# Patient Record
Sex: MALE | Race: OTHER | Employment: FULL TIME | URBAN - METROPOLITAN AREA
[De-identification: names, ages, dates, MRNs, and addresses within clinical notes are randomized per-mention and may not be internally consistent; named-entity substitution may affect disease eponyms.]

---
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
## 2021-01-02 ENCOUNTER — APPOINTMENT (OUTPATIENT)
Dept: GENERAL RADIOLOGY | Age: 21
End: 2021-01-02
Payer: MEDICAID

## 2021-01-02 ENCOUNTER — HOSPITAL ENCOUNTER (EMERGENCY)
Age: 21
Discharge: ANOTHER HOSPITAL | End: 2021-01-02
Attending: EMERGENCY MEDICINE
Payer: MEDICAID

## 2021-01-02 ENCOUNTER — HOSPITAL ENCOUNTER (EMERGENCY)
Age: 21
Discharge: HOME | End: 2021-01-03
Attending: EMERGENCY MEDICINE
Payer: MEDICAID

## 2021-01-02 VITALS
SYSTOLIC BLOOD PRESSURE: 146 MMHG | HEART RATE: 80 BPM | BODY MASS INDEX: 33.32 KG/M2 | DIASTOLIC BLOOD PRESSURE: 91 MMHG | OXYGEN SATURATION: 95 % | RESPIRATION RATE: 18 BRPM | TEMPERATURE: 98.1 F | HEIGHT: 72 IN | WEIGHT: 246 LBS

## 2021-01-02 VITALS
RESPIRATION RATE: 16 BRPM | BODY MASS INDEX: 33.32 KG/M2 | HEART RATE: 88 BPM | OXYGEN SATURATION: 98 % | TEMPERATURE: 100.1 F | SYSTOLIC BLOOD PRESSURE: 159 MMHG | DIASTOLIC BLOOD PRESSURE: 100 MMHG | WEIGHT: 246 LBS | HEIGHT: 72 IN

## 2021-01-02 DIAGNOSIS — S56.429A EXTENSOR TENDON LACERATION OF FINGER WITH OPEN WOUND, INITIAL ENCOUNTER: Primary | ICD-10-CM

## 2021-01-02 DIAGNOSIS — S61.411A LACERATION OF RIGHT HAND WITHOUT FOREIGN BODY, INITIAL ENCOUNTER: Primary | ICD-10-CM

## 2021-01-02 DIAGNOSIS — S56.429A EXTENSOR TENDON LACERATION OF FINGER WITH OPEN WOUND, INITIAL ENCOUNTER: ICD-10-CM

## 2021-01-02 DIAGNOSIS — S61.209A EXTENSOR TENDON LACERATION OF FINGER WITH OPEN WOUND, INITIAL ENCOUNTER: Primary | ICD-10-CM

## 2021-01-02 DIAGNOSIS — S61.209A EXTENSOR TENDON LACERATION OF FINGER WITH OPEN WOUND, INITIAL ENCOUNTER: ICD-10-CM

## 2021-01-02 PROCEDURE — 96374 THER/PROPH/DIAG INJ IV PUSH: CPT

## 2021-01-02 PROCEDURE — 99283 EMERGENCY DEPT VISIT LOW MDM: CPT

## 2021-01-02 PROCEDURE — 12004 RPR S/N/AX/GEN/TRK7.6-12.5CM: CPT

## 2021-01-02 PROCEDURE — 6360000002 HC RX W HCPCS

## 2021-01-02 PROCEDURE — 6360000002 HC RX W HCPCS: Performed by: STUDENT IN AN ORGANIZED HEALTH CARE EDUCATION/TRAINING PROGRAM

## 2021-01-02 PROCEDURE — 6370000000 HC RX 637 (ALT 250 FOR IP): Performed by: EMERGENCY MEDICINE

## 2021-01-02 PROCEDURE — 96365 THER/PROPH/DIAG IV INF INIT: CPT

## 2021-01-02 PROCEDURE — 6360000002 HC RX W HCPCS: Performed by: EMERGENCY MEDICINE

## 2021-01-02 PROCEDURE — 6370000000 HC RX 637 (ALT 250 FOR IP)

## 2021-01-02 PROCEDURE — 73130 X-RAY EXAM OF HAND: CPT

## 2021-01-02 PROCEDURE — 2580000003 HC RX 258: Performed by: EMERGENCY MEDICINE

## 2021-01-02 PROCEDURE — 99285 EMERGENCY DEPT VISIT HI MDM: CPT

## 2021-01-02 PROCEDURE — 64450 NJX AA&/STRD OTHER PN/BRANCH: CPT

## 2021-01-02 RX ORDER — FENTANYL CITRATE 50 UG/ML
50 INJECTION, SOLUTION INTRAMUSCULAR; INTRAVENOUS ONCE
Status: COMPLETED | OUTPATIENT
Start: 2021-01-02 | End: 2021-01-02

## 2021-01-02 RX ORDER — OXYCODONE HYDROCHLORIDE AND ACETAMINOPHEN 5; 325 MG/1; MG/1
1 TABLET ORAL ONCE
Status: COMPLETED | OUTPATIENT
Start: 2021-01-02 | End: 2021-01-02

## 2021-01-02 RX ORDER — LIDOCAINE HYDROCHLORIDE AND EPINEPHRINE 10; 10 MG/ML; UG/ML
20 INJECTION, SOLUTION INFILTRATION; PERINEURAL ONCE
Status: COMPLETED | OUTPATIENT
Start: 2021-01-03 | End: 2021-01-03

## 2021-01-02 RX ORDER — THROMB-CAL-CELL-DRESSING,HEMOS 3" X 3"
PADS, MEDICATED (EA) TOPICAL
Status: COMPLETED
Start: 2021-01-02 | End: 2021-01-02

## 2021-01-02 RX ORDER — TRANEXAMIC ACID 100 MG/ML
1000 INJECTION, SOLUTION INTRAVENOUS ONCE
Status: COMPLETED | OUTPATIENT
Start: 2021-01-03 | End: 2021-01-03

## 2021-01-02 RX ORDER — THROMB-CAL-CELL-DRESSING,HEMOS 3" X 3"
1 PADS, MEDICATED (EA) TOPICAL PRN
Status: DISCONTINUED | OUTPATIENT
Start: 2021-01-02 | End: 2021-01-02 | Stop reason: HOSPADM

## 2021-01-02 RX ORDER — FENTANYL CITRATE 50 UG/ML
INJECTION, SOLUTION INTRAMUSCULAR; INTRAVENOUS
Status: COMPLETED
Start: 2021-01-02 | End: 2021-01-02

## 2021-01-02 RX ADMIN — Medication 1 EACH: at 21:38

## 2021-01-02 RX ADMIN — OXYCODONE HYDROCHLORIDE AND ACETAMINOPHEN 1 TABLET: 5; 325 TABLET ORAL at 20:34

## 2021-01-02 RX ADMIN — FENTANYL CITRATE 50 MCG: 50 INJECTION, SOLUTION INTRAMUSCULAR; INTRAVENOUS at 23:04

## 2021-01-02 RX ADMIN — FENTANYL CITRATE 50 MCG: 50 INJECTION, SOLUTION INTRAMUSCULAR; INTRAVENOUS at 23:20

## 2021-01-02 RX ADMIN — CEFAZOLIN 1 G: 1 INJECTION, POWDER, FOR SOLUTION INTRAMUSCULAR; INTRAVENOUS at 20:58

## 2021-01-02 ASSESSMENT — PAIN SCALES - GENERAL
PAINLEVEL_OUTOF10: 10
PAINLEVEL_OUTOF10: 10

## 2021-01-02 ASSESSMENT — PAIN DESCRIPTION - LOCATION: LOCATION: HAND

## 2021-01-02 ASSESSMENT — ENCOUNTER SYMPTOMS
SHORTNESS OF BREATH: 0
NAUSEA: 0
SORE THROAT: 0
DIARRHEA: 0
CONSTIPATION: 0
VOMITING: 0
ABDOMINAL PAIN: 0

## 2021-01-02 ASSESSMENT — PAIN DESCRIPTION - PAIN TYPE: TYPE: ACUTE PAIN

## 2021-01-03 PROCEDURE — 2500000003 HC RX 250 WO HCPCS: Performed by: STUDENT IN AN ORGANIZED HEALTH CARE EDUCATION/TRAINING PROGRAM

## 2021-01-03 PROCEDURE — 6360000002 HC RX W HCPCS

## 2021-01-03 RX ORDER — CEPHALEXIN 500 MG/1
500 CAPSULE ORAL 4 TIMES DAILY
Qty: 28 CAPSULE | Refills: 0 | Status: SHIPPED | OUTPATIENT
Start: 2021-01-03 | End: 2021-01-10

## 2021-01-03 RX ORDER — IBUPROFEN 800 MG/1
800 TABLET ORAL EVERY 8 HOURS PRN
Qty: 21 TABLET | Refills: 0 | Status: SHIPPED | OUTPATIENT
Start: 2021-01-03 | End: 2021-01-05

## 2021-01-03 RX ORDER — FENTANYL CITRATE 50 UG/ML
INJECTION, SOLUTION INTRAMUSCULAR; INTRAVENOUS
Status: COMPLETED
Start: 2021-01-03 | End: 2021-01-03

## 2021-01-03 RX ORDER — HYDROCODONE BITARTRATE AND ACETAMINOPHEN 5; 325 MG/1; MG/1
1 TABLET ORAL EVERY 8 HOURS PRN
Qty: 9 TABLET | Refills: 0 | Status: SHIPPED | OUTPATIENT
Start: 2021-01-03 | End: 2021-01-06

## 2021-01-03 RX ADMIN — FENTANYL CITRATE 100 MCG: 50 INJECTION, SOLUTION INTRAMUSCULAR; INTRAVENOUS at 00:54

## 2021-01-03 RX ADMIN — LIDOCAINE HYDROCHLORIDE,EPINEPHRINE BITARTRATE 20 ML: 10; .01 INJECTION, SOLUTION INFILTRATION; PERINEURAL at 01:01

## 2021-01-03 RX ADMIN — TRANEXAMIC ACID 1000 MG: 100 INJECTION, SOLUTION INTRAVENOUS at 01:01

## 2021-01-03 ASSESSMENT — ENCOUNTER SYMPTOMS
VOMITING: 0
COUGH: 0
RHINORRHEA: 0
SHORTNESS OF BREATH: 0
NAUSEA: 0
ABDOMINAL PAIN: 0

## 2021-01-03 NOTE — ED PROVIDER NOTES
Negative for arthralgias and myalgias. Skin: Positive for wound. Neurological: Positive for weakness. Psychiatric/Behavioral: Negative for agitation and confusion. PHYSICAL EXAM   (up to 7 for level 4, 8 or more for level 5)      INITIAL VITALS:   BP (!) 159/100   Pulse 88   Temp 100.1 °F (37.8 °C) (Oral)   Resp 16   Ht 6' (1.829 m)   Wt 246 lb (111.6 kg)   SpO2 98%   BMI 33.36 kg/m²     Physical Exam  Vitals signs and nursing note reviewed. Constitutional:       General: He is not in acute distress. Appearance: Normal appearance. He is well-developed. He is not ill-appearing or diaphoretic. HENT:      Head: Normocephalic and atraumatic. Right Ear: External ear normal.      Left Ear: External ear normal.      Nose: Nose normal.      Mouth/Throat:      Mouth: Mucous membranes are moist.   Eyes:      Extraocular Movements: Extraocular movements intact. Conjunctiva/sclera: Conjunctivae normal.   Neck:      Musculoskeletal: Normal range of motion and neck supple. Trachea: No tracheal deviation. Cardiovascular:      Rate and Rhythm: Normal rate and regular rhythm. Pulmonary:      Effort: Pulmonary effort is normal. No respiratory distress. Abdominal:      General: Abdomen is flat. There is no distension. Musculoskeletal: Normal range of motion. General: No swelling, deformity or signs of injury. Comments: Capillary refill intact in index, middle, ring, pinky finger of R hand   Skin:     General: Skin is warm and dry. Capillary Refill: Capillary refill takes less than 2 seconds. Coloration: Skin is not jaundiced. Findings: No bruising or lesion. Comments: Significant V-shaped laceration is present on the dorsum of the right hand. Patient cannot extend the middle finger and possibly the ring finger as well although examination is limited by initial patient cooperation. Neurological:      General: No focal deficit present.       Mental Status: He is alert and oriented to person, place, and time. Mental status is at baseline. Motor: No abnormal muscle tone. DIFFERENTIAL  DIAGNOSIS     PLAN (LABS / IMAGING / EKG):  Orders Placed This Encounter   Procedures    XR HAND RIGHT (MIN 3 VIEWS)       MEDICATIONS ORDERED:  Orders Placed This Encounter   Medications    ceFAZolin (ANCEF) 1 g in dextrose 5 % 50 mL IVPB (mini-bag)     Order Specific Question:   Antimicrobial Indications     Answer:   Surgical Prophylaxis    oxyCODONE-acetaminophen (PERCOCET) 5-325 MG per tablet 1 tablet    Thrombi-Pad 3\"X3\" PADS     HARMON, SILVIO: cabinet override    DISCONTD: Thrombi-Pad 3\"X3\" PADS 1 each       DDX: Anum Stein is a 24 y.o. male who presents to the emergency department with right hand dorsal laceration. Differential diagnosis includes bony, tendon, or joint involvement    DIAGNOSTIC RESULTS / EMERGENCY DEPARTMENT COURSE / MDM   LAB RESULTS:  No results found for this visit on 01/02/21. IMPRESSION: Anum Stein is a 24 y.o. male who presents to the emergency department with right hand laceration. On examination he is afebrile, vital signs demonstrate tachycardia and hypertension and a temperature of 100.1 and examination demonstrates an uncomfortable appearing male of stated age who is holding the right hand which is bloody as patient refused a bandage. Vital signs up-to-date. RADIOLOGY:  No results found. EKG  None    All EKG's are interpreted by the Emergency Department Physician who either signs or co-signs this chart in the absence of a cardiologist.    EMERGENCY DEPARTMENT COURSE:   Images reviewed, no obvious foreign body. Patient provided Percocet resulting in improvement in anxiety and pain. Local anesthesia was obtained in the dorsal laceration with lidocaine and the patient received greater than 1 L of sterile saline irrigation of the dorsal laceration.   At the time of assessment it was noted that the extensor digitorum tendon was completely lacerated with the proximal and not visible in the wound. Significant pooling of blood was noted and after irrigation the wound was dressed with impregnated gauze, 4 x 4's, wrapped in Kerlix and pressure dressed with Coban. At that time it was deemed that this laceration required hand surgery and Dr. Philip Noonan was called who stated he is unavailable to take the patient. Plan for transfer to [facility]. PROCEDURES:  PROCEDURE NOTE - MEDIAN, ULNAR, RADIAL NERVE BLOCKS    PATIENT NAME: [address] [place] [address] RECORD NO. [id number]  DATE: 1/2/2021  ATTENDING PHYSICIAN: Bryant    PREOPERATIVE DIAGNOSIS: Hand pain  POSTOPERATIVE DIAGNOSIS:  Same  PROCEDURE PERFORMED:  Median, ulnar, radial nerve blocks  PERFORMING PHYSICIAN: Alyssa Palomares MD    DISCUSSION:  Casandra Mohan is a 24y.o.-year-old male who requires median, ulnar, and radial nerve blocks for pain relief. The history and physical examination were reviewed and confirmed. CONSENT: The patient provided verbal consent for this procedure. PROCEDURE: The patient was placed in the supine position with left arm on a towel at his side. his arm was prepped using chlorhexidine. 10mL of 1% Lidocaine without epinephrine was drawn up into a syringe. Ultrasound was used to locate the landmarks and the target nerves. The radial nerve was identified just lateral to the radial artery and accompanying veins. The 22ga needle was advanced medially from the lateral side of the arm until the tip of the needle was just next to the radial nerve. 3mL of 1% Lidocaine without epinephrine was injected into the nerve sheath and adequate spread of the anesthetic was observed. The needle was then withdrawn. The patient tolerated the procedure well. Complications: None. Alyssa Palomares MD  11:33 PM, 1/2/21      CONSULTS:  None    CRITICAL CARE:  Please see attending note. FINAL IMPRESSION      1.  Extensor tendon laceration of finger with open wound, initial encounter          DISPOSITION / PLAN     DISPOSITION Decision To Transfer 01/02/2021 09:39:28 PM      PATIENT REFERRED TO:  No follow-up provider specified. DISCHARGE MEDICATIONS:  Discharge Medication List as of 1/2/2021 10:26 PM          Kirsten Drew MD  Emergency Medicine Resident    This patient was evaluated in the Emergency Department for symptoms described in the history of present illness. He/she was evaluated in the context of the global COVID-19 pandemic, which necessitated consideration that the patient might be at risk for infection with the SARS-CoV-2 virus that causes COVID-19. Institutional protocols and algorithms that pertain to the evaluation of patients at risk for COVID-19 are in a state of rapid change based on information released by regulatory bodies including the CDC and federal and state organizations. These policies and algorithms were followed during the patient's care in the ED.     (Please note that portions of thisnote were completed with a voice recognition program.  Efforts were made to edit the dictations but occasionally words are mis-transcribed.)        Kirsten Drew MD  Resident  01/02/21 0049       Kirsten Drew MD  Resident  01/02/21 1694

## 2021-01-03 NOTE — ED PROVIDER NOTES
[facility]  Emergency Department Encounter  Emergency Medicine Resident     Pt Name: Rashard Jenkins  MRN: [id number]  [place] 2000  Date of evaluation: 1/3/21  PCP:  No primary care provider on file. CHIEF COMPLAINT       Chief Complaint   Patient presents with    Laceration     right hand       HISTORY OF PRESENT ILLNESS  (Location/Symptom, Timing/Onset, Context/Setting, Quality, Duration, Modifying Factors, Severity.)    Rashard Jenkins is a 24 y.o. male who presents with lacerations to the right hand. Patient was seen at [facility] earlier [facility]. Patient punched a mirror out of anger because he was [place] a bad day \". Large laceration over the MCP of the third digit on the right hand which prompted patient to be transferred to [facility]. Gus's due to the fact that extensor tendon was involved. At [facility], wound was washed out, x-rays obtained, tetanus shot was deemed up-to-date, patient was given IV Ancef. X-rays negative for fracture. Patient states he still having pain to the area but is otherwise comfortable. Patient denies any other injuries. PPE Worn:  Gloves: Yes  Eye Protection: Goggles  Mask: Surgical Mask  Gown: NO    PAST MEDICAL / SURGICAL / SOCIAL / FAMILY HISTORY    has a past medical history of ADHD (attention deficit hyperactivity disorder). has no past surgical history on file.     Social History     Socioeconomic History    Marital status: Single     Spouse name: Not on file    Number of children: Not on file    Years of education: Not on file    Highest education level: Not on file   Occupational History    Not on file   Social Needs    Financial resource strain: Not on file    Food insecurity     Worry: Not on file     Inability: Not on file    Transportation needs     Medical: Not on file     Non-medical: Not on file   Tobacco Use    Smoking status: Passive Smoke Exposure - Never Smoker    Smokeless tobacco: Never Used Substance and Sexual Activity    Alcohol use: No    Drug use: No    Sexual activity: Not on file   Lifestyle    Physical activity     Days per week: Not on file     Minutes per session: Not on file    Stress: Not on file   Relationships    Social connections     Talks on phone: Not on file     Gets together: Not on file     Attends [employer] service: Not on file     Active member of club or organization: Not on file     Attends meetings of clubs or organizations: Not on file     Relationship status: Not on file    Intimate partner violence     Fear of current or ex partner: Not on file     Emotionally abused: Not on file     Physically abused: Not on file     Forced sexual activity: Not on file   Other Topics Concern    Not on file   Social History Narrative    Not on file       History reviewed. No pertinent family history. Allergies:    Patient has no known allergies. Home Medications:  Prior to Admission medications    Medication Sig Start Date End Date Taking? Authorizing Provider   cephALEXin (KEFLEX) 500 MG capsule Take 1 capsule by mouth 4 times daily for 7 days 1/3/21 1/10/21 Yes Kassy Cash MD   HYDROcodone-acetaminophen (NORCO) 5-325 MG per tablet Take 1 tablet by mouth every 8 hours as needed for Pain for up to 3 days. Intended supply: 3 days. Take lowest dose possible to manage pain 1/3/21 1/6/21 Yes Kassy Cash MD   ibuprofen (ADVIL;MOTRIN) 800 MG tablet Take 1 tablet by mouth every 8 hours as needed for Pain 1/3/21  Yes Kassy Cash MD   acetaminophen (TYLENOL) 325 MG tablet Take 650 mg by mouth every 6 hours as needed for Pain  1/3/21  Historical Provider, MD   methylphenidate (CONCERTA) 36 MG extended release tablet Take 36 mg by mouth every morning. Coreen Drake Historical Provider, MD       REVIEW OF SYSTEMS    (2-9 systems for level 4, 10 or more for level 5)    Review of Systems   Constitutional: Negative for chills, fatigue and fever.    HENT: Negative for congestion and rhinorrhea. Respiratory: Negative for cough and shortness of breath. Cardiovascular: Negative for chest pain. Gastrointestinal: Negative for abdominal pain, nausea and vomiting. Skin: Positive for wound. Neurological: Negative for light-headedness and headaches. All other systems reviewed and are negative. PHYSICAL EXAM   (up to 7 for level 4, 8 or more for level 5)    INITIAL VITALS:   ED Triage Vitals [01/02/21 2247]   BP Temp Temp src Pulse Resp SpO2 Height Weight   (!) 146/91 98.1 °F (36.7 °C) -- 80 18 95 % 6' (1.829 m) 246 lb (111.6 kg)       Physical Exam  Vitals signs and nursing note reviewed. Constitutional:       General: He is not in acute distress. Appearance: Normal appearance. He is not ill-appearing. Cardiovascular:      Rate and Rhythm: Normal rate and regular rhythm. Pulses: Normal pulses. Radial pulses are 2+ on the right side and 2+ on the left side. Heart sounds: Normal heart sounds. No murmur. No friction rub. Pulmonary:      Effort: Pulmonary effort is normal. No respiratory distress. Breath sounds: Normal breath sounds. No decreased breath sounds. Abdominal:      Palpations: Abdomen is soft. Tenderness: There is no abdominal tenderness. Musculoskeletal:         General: Tenderness and signs of injury present. Right hand: He exhibits decreased range of motion, tenderness and laceration. He exhibits normal capillary refill. Decreased sensation noted. Decreased strength noted. Hands:       Comments: Lacerations to the indicated areas. Normal sensation to the thumb and the second digit on the right hand. Decreased sensation to the third and fourth digit on the right hand. Normal sensation to the fifth digit on the right hand. Inability to extend the third digit. Decreased ability to extend the fourth digit on the right hand. Fully able to flex all 5 digits on the right hand.   Laceration to the PIP on the second digit, approximately 2 to 3 cm, nongaping. Half centimeter laceration to the PIP joint on the third digit. Large gaping L-shaped laceration to the MCP on the third digit. Exposed extender tendon visible. Extensor tendon is partially lacerated. Oozing blood present in the wound. No visible vessel identified. No foreign body seen. Skin:     General: Skin is warm and dry. Capillary Refill: Capillary refill takes less than 2 seconds. Neurological:      General: No focal deficit present. Mental Status: He is alert and oriented to person, place, and time. DIFFERENTIAL  DIAGNOSIS   PLAN (LABS / IMAGING / EKG):  Orders Placed This Encounter   Procedures    Inpatient consult to Plastic Surgery       MEDICATIONS ORDERED:  Orders Placed This Encounter   Medications    fentaNYL (SUBLIMAZE) 100 MCG/2ML injection     Melissa Fulton: cabinet override    fentaNYL (SUBLIMAZE) injection 50 mcg    fentaNYL (SUBLIMAZE) injection 50 mcg    tranexamic acid (CYKLOKAPRON) injection 1,000 mg    lidocaine-EPINEPHrine 1 percent-1:812421 injection 20 mL    fentaNYL (SUBLIMAZE) 100 MCG/2ML injection     Melissa Fulton: cabinet override    cephALEXin (KEFLEX) 500 MG capsule     Sig: Take 1 capsule by mouth 4 times daily for 7 days     Dispense:  28 capsule     Refill:  0    HYDROcodone-acetaminophen (NORCO) 5-325 MG per tablet     Sig: Take 1 tablet by mouth every 8 hours as needed for Pain for up to 3 days. Intended supply: 3 days.  Take lowest dose possible to manage pain     Dispense:  9 tablet     Refill:  0    ibuprofen (ADVIL;MOTRIN) 800 MG tablet     Sig: Take 1 tablet by mouth every 8 hours as needed for Pain     Dispense:  21 tablet     Refill:  0         DIAGNOSTIC RESULTS / EMERGENCYDEPARTMENT COURSE / MDM   LABS:  Labs Reviewed - No data to display    RADIOLOGY:  Xr Hand Right (min 3 Views)    Result Date: 1/2/2021  EXAMINATION: THREE XRAY VIEWS OF THE RIGHT HAND 1/2/2021 6:01 pm COMPARISON: None. HISTORY: ORDERING SYSTEM PROVIDED HISTORY: R dorsal hand laceration s/p punching mirror TECHNOLOGIST PROVIDED HISTORY: R dorsal hand laceration s/p punching mirror Reason for Exam: R dorsal hand laceration s/p punching mirror Acuity: Acute Type of Exam: Initial FINDINGS: The 3rd PIP joint is in a flexed position. There is no evidence of fracture or dislocation. The joint spaces appear well maintained. Soft tissue swelling along the dorsal metacarpal bones. Soft tissue swelling No acute bony abnormalities are noted       Impression:  Patient presents with a several wounds to the right hand largest of which over the MCP third digit. Extensor tendon is seen, it is partially lacerated. Pressure bandage was placed over the area since it was still bleeding. Will speak with plastic surgeon regarding course of action. Wound was washed out again with 2 L of saline and a pressure bandage was placed on it again. EMERGENCY DEPARTMENT COURSE:  ED Course as of Jan 03 0202   Sat Jan 02, 2021   8697 Spoke with Dr. Leodan Hartman, states that he wants us to close the wound, start the patient on Keflex, put him in a splint in extension, and have the patient follow-up with his office on Monday. [AP]      ED Course User Index  [AP] Wes Kang MD     Wound was washed out again with another liter of saline and no foreign bodies were seen, no other tendon damage was seen other than the extensor tendon to the third digit. Wounds closed loosely as below. Patient placed in a extension volar splint. Patient had already received the IV Ancef therefore was just given a prescription for Keflex which she will start tomorrow morning. Pain control prescriptions for Motrin and Norco.  Patient to call Dr. Leodan Hartman on Monday. Patient given strict return precautions. These instructions were given both the patient and to his mother. Patient safe for discharge.     MDM  Number of Diagnoses or Management Options was partially lacerated on the dorsum of the right hand. The wound was repaired with 5-0 Prolene using interrupted sutures, 6 sutures were placed in a loose fashion. 2 sutures were placed on the PIP of the second digit, one suture was placed on the PIP of the third digit. The wound was dressed with bacitracin, a bandage, vaseline soaked gauze and Coban. All sponge, instrument and needle counts were correct at the completion of the procedure. The patient tolerated the procedure well. SUTURE COUNT:  Suture count: 9    COMPLICATIONS:  None     Dinesh Mayberry MD  2:02 AM, [birth year]      Splint Application    Date/Time: 1/3/2021 2:06 AM  Performed by: Dinesh Mayberry MD  Authorized by: Nic Vidales DO     Consent:     Consent obtained:  Verbal    Consent given by:  Patient    Risks discussed:  Discoloration, numbness, pain and swelling    Alternatives discussed:  No treatment  Pre-procedure details:     Sensation:  Normal    Skin color:  Warm, dry  Procedure details:     Laterality:  Right    Location:  Wrist    Wrist:  R wrist    Strapping: no      Splint type:  Wrist and volar short arm    Supplies:  Ortho-Glass  Post-procedure details:     Pain:  Improved    Sensation:  Normal    Skin color:  Warm, dry    Patient tolerance of procedure: Tolerated well, no immediate complications  Comments:      Right wrist was placed in a volar short arm cock-up splint placed the wrist in extension. CONSULTS:  IP CONSULT TO PLASTIC SURGERY    CRITICAL CARE:  Please see attending note    FINAL IMPRESSION     1. Laceration of right hand without foreign body, initial encounter    2. Extensor tendon laceration of finger with open wound, initial encounter          DISPOSITION / PLAN   DISPOSITION Decision To Discharge 01/03/2021 01:53:27 AM      Evaluation and treatment course in the ED, and plan of care upon discharge was discussed in length with the patient.   Patient had no further questions prior to being discharged and was instructed to return to the ED for new or worsening symptoms. Any changes to existing medications or new prescriptions were reviewed with patient and they expressed understanding of how to correctly take their medications and the possible side effects. PATIENT REFERRED TO:  Shell Mancia MD  [address] [id number] [phone]    Call on 1/4/2021        DISCHARGE MEDICATIONS:  New Prescriptions    CEPHALEXIN (KEFLEX) 500 MG CAPSULE    Take 1 capsule by mouth 4 times daily for 7 days    HYDROCODONE-ACETAMINOPHEN (NORCO) 5-325 MG PER TABLET    Take 1 tablet by mouth every 8 hours as needed for Pain for up to 3 days. Intended supply: 3 days.  Take lowest dose possible to manage pain       Jody Krishnan DO  Emergency Medicine Resident Physician, PGY-2    (Please note that portions of this note were completed with a voice recognition program.  Efforts were made to edit the dictations but occasionally words are mis-transcribed.)          Jody Krishnan MD  01/03/21 5661

## 2021-01-03 NOTE — ED TRIAGE NOTES
Pt transferred from [facility] for Lac to right hand with possible tendon injury. Pt unable to move middle finger. Pulses intact. When pt arrived hand was rapped with a thrombi pad on lac. RR even and unlabored, NAD, A&O, ambulatory. Call light in reach at bedside.

## 2021-01-03 NOTE — ED NOTES
[facility] here for pt transport to [facility] ED.      Carlos Mims RN  01/02/21 3882

## 2021-01-03 NOTE — ED TRIAGE NOTES
Pt arrives to ED via private vehicle in wheelchair. Before bringing pt back, pt asked to put mask on, pt states \"I ain't wearing a fucking mask\" informed pt that if he wants us to evaluate him he needs to put his mask on. Pt states \"I don't even want to fucking be here in the first place\" pt placed mask on and this writer wheeled pt back to room asked pt how he got his laceration pt states \"I don't want to talk about it\" informed pt that we are here to help him and that he does not need to be rude to us. Pt yells \"I just had a bad day\". Pt refuses to hold pressure on his laceration and is dripping blood gave pt a basin to hold his hand over. laceration is deep and large on top of right hand and could possibly involve tendons. Pt states that he can't move his middle finger. Pt states he thinks theres glass in it. Informed pt that we will probably need to do an xray before the sutures pt says \"hell no I don't want that\" pts mom states \"can't you just glue it\" informed them that his laceration is too extensive to glue and he will likely need follow up with a hand surgeon.

## 2021-01-03 NOTE — ED NOTES
Bed: 34  Expected date:   Expected time:   Means of arrival:   Comments:  [address], [facility]  01/02/21 3584

## 2021-01-03 NOTE — ED NOTES
Pt resting on cot, RR even and unlabored, NAD, A&O, call light in reach, denies any needs     Saba Lopez, RN  01/03/21 5444

## 2021-01-03 NOTE — ED PROVIDER NOTES
[facility]     Emergency Department     Faculty Attestation    I performed a history and physical examination of the patient and discussed management with the resident. I have reviewed and agree with the residents findings including all diagnostic interpretations, and treatment plans as written. Any areas of disagreement are noted on the chart. I was personally present for the key portions of any procedures. I have documented in the chart those procedures where I was not present during the key portions. I have reviewed the emergency nurses triage note. I agree with the chief complaint, past medical history, past surgical history, allergies, medications, social and family history as documented unless otherwise noted below. Documentation of the HPI, Physical Exam and Medical Decision Making performed by scribilana is based on my personal performance of the HPI, PE and MDM. For Physician Assistant/ Nurse Practitioner cases/documentation I have personally evaluated this patient and have completed at least one if not all key elements of the E/M (history, physical exam, and MDM). Additional findings are as noted. 23 yo M transfer from [facility] laceration dorsal r hand, extensor tendon involvement, given ancef, dT current  pe vss avulsion dorsal r hand proximal to 3rd 4th mcp, no extension of 3rd 4th digits, brisk capillary refill to distal 3rd 4th digits,     -plastics consult, directs us to repair \\ refer Monday,   - wound cleansed, I was present for key portion of repair, + closure,   Splint, abx,     EKG Interpretation    Interpreted by me      CRITICAL CARE: There was a high probability of clinically significant/life threatening deterioration in this patient's condition which required my urgent intervention. Total critical care time was 10 minutes. This excludes any time for separately reportable procedures.        Ba [address], DO 01/02/21 [facility]  01/03/21 [facility], DO  01/03/21 9032

## 2021-01-03 NOTE — ED NOTES
Lester Calzada, 18-year-old male right-hand-dominant punched a mirror he has an extensor tendon laceration of his middle finger with concerns for a small arterial bleed. Patient is right-hand dominant. Is unable to extend the middle finger.     Accepted by Velma Murdock RN  01/02/21 8496

## 2021-01-03 NOTE — ED PROVIDER NOTES
and recommended transfer. Patient received Ancef here, tetanus up-to-date.     (Please note that portions of this note were completed with a voice recognition program.  Efforts were made to edit the dictations but occasionally words are mis-transcribed.)    Danielle Rodriguez DO  Attending Emergency Physician         Danielle Rodriguez DO  01/02/21 2009       Danielle Rodriguez DO  01/02/21 0427

## 2021-01-03 NOTE — ED NOTES
Mode of arrival (squad #, walk in, police, etc) : Walk-in    Chief complaint(s): Hand injury    Arrival Note (brief scenario, treatment PTA, etc). : Pt presents to ED with a hand injury and laceration to dorsal aspect of right hand. Pt punched through a mirror injuring his hand. Pt unable to move fingers on right hand. Radial pulse strong, cap refill <3. Pt is A&O x4, PWD, eupneic, and no distress noted. C= \"Have you ever felt that you should Cut down on your drinking? \"  No  A= \"Have people Annoyed you by criticizing your drinking? \"  No  G= \"Have you ever felt bad or Guilty about your drinking? \"  No  E= \"Have you ever had a drink as an Eye-opener first thing in the morning to steady your nerves or to help a hangover? \"  No      Deferred []      Reason for deferring: N/A    *If yes to two or more: probable alcohol abuse. Mandi Bautista RN  01/02/21 5017

## 2021-01-04 PROBLEM — J02.0 STREP PHARYNGITIS: Status: ACTIVE | Noted: 2017-05-04

## 2021-01-04 PROBLEM — M25.569 KNEE PAIN: Status: ACTIVE | Noted: 2017-08-15

## 2021-01-05 PROBLEM — S66.322A LACERATION OF EXTENSOR MUSCLE, FASCIA AND TENDON OF RIGHT MIDDLE FINGER AT WRIST AND HAND LEVEL, INITIAL ENCOUNTER: Status: ACTIVE | Noted: 2021-01-05

## 2021-01-07 ENCOUNTER — HOSPITAL ENCOUNTER (OUTPATIENT)
Dept: LAB | Age: 21
Setting detail: SPECIMEN
Discharge: HOME | End: 2021-01-07
Payer: MEDICAID

## 2021-01-07 DIAGNOSIS — Z20.822 COVID-19 RULED OUT BY LABORATORY TESTING: Primary | ICD-10-CM

## 2021-01-07 PROCEDURE — U0005 INFEC AGEN DETEC AMPLI PROBE: HCPCS

## 2021-01-07 PROCEDURE — U0003 INFECTIOUS AGENT DETECTION BY NUCLEIC ACID (DNA OR RNA); SEVERE ACUTE RESPIRATORY SYNDROME CORONAVIRUS 2 (SARS-COV-2) (CORONAVIRUS DISEASE [COVID-19]), AMPLIFIED PROBE TECHNIQUE, MAKING USE OF HIGH THROUGHPUT TECHNOLOGIES AS DESCRIBED BY CMS-2020-01-R: HCPCS

## 2021-01-08 LAB
SARS-COV-2, RAPID: NORMAL
SARS-COV-2: NORMAL
SARS-COV-2: NOT DETECTED
SOURCE: NORMAL

## 2021-01-09 ENCOUNTER — TELEPHONE (OUTPATIENT)
Dept: PRIMARY CARE CLINIC | Age: 21
End: 2021-01-09

## 2021-01-10 ENCOUNTER — ANESTHESIA EVENT (OUTPATIENT)
Dept: OPERATING ROOM | Age: 21
End: 2021-01-10
Payer: MEDICAID

## 2021-01-11 ENCOUNTER — HOSPITAL ENCOUNTER (OUTPATIENT)
Age: 21
Setting detail: OUTPATIENT SURGERY
Discharge: HOME | End: 2021-01-11
Attending: PLASTIC SURGERY | Admitting: PLASTIC SURGERY
Payer: MEDICAID

## 2021-01-11 ENCOUNTER — ANESTHESIA (OUTPATIENT)
Dept: OPERATING ROOM | Age: 21
End: 2021-01-11
Payer: MEDICAID

## 2021-01-11 VITALS
HEIGHT: 72 IN | DIASTOLIC BLOOD PRESSURE: 90 MMHG | WEIGHT: 249.12 LBS | HEART RATE: 82 BPM | SYSTOLIC BLOOD PRESSURE: 159 MMHG | RESPIRATION RATE: 16 BRPM | BODY MASS INDEX: 33.74 KG/M2 | TEMPERATURE: 98.6 F | OXYGEN SATURATION: 84 %

## 2021-01-11 VITALS — SYSTOLIC BLOOD PRESSURE: 190 MMHG | TEMPERATURE: 96.7 F | OXYGEN SATURATION: 96 % | DIASTOLIC BLOOD PRESSURE: 119 MMHG

## 2021-01-11 DIAGNOSIS — G89.18 POSTOPERATIVE PAIN: Primary | ICD-10-CM

## 2021-01-11 PROCEDURE — 3700000001 HC ADD 15 MINUTES (ANESTHESIA): Performed by: PLASTIC SURGERY

## 2021-01-11 PROCEDURE — 3600000003 HC SURGERY LEVEL 3 BASE: Performed by: PLASTIC SURGERY

## 2021-01-11 PROCEDURE — 2580000003 HC RX 258: Performed by: ANESTHESIOLOGY

## 2021-01-11 PROCEDURE — 7100000040 HC SPAR PHASE II RECOVERY - FIRST 15 MIN: Performed by: PLASTIC SURGERY

## 2021-01-11 PROCEDURE — 2500000003 HC RX 250 WO HCPCS: Performed by: SPECIALIST

## 2021-01-11 PROCEDURE — 6370000000 HC RX 637 (ALT 250 FOR IP): Performed by: ANESTHESIOLOGY

## 2021-01-11 PROCEDURE — 6360000002 HC RX W HCPCS: Performed by: ANESTHESIOLOGY

## 2021-01-11 PROCEDURE — 3700000000 HC ANESTHESIA ATTENDED CARE: Performed by: PLASTIC SURGERY

## 2021-01-11 PROCEDURE — 3600000013 HC SURGERY LEVEL 3 ADDTL 15MIN: Performed by: PLASTIC SURGERY

## 2021-01-11 PROCEDURE — 2580000003 HC RX 258: Performed by: PLASTIC SURGERY

## 2021-01-11 PROCEDURE — 7100000000 HC PACU RECOVERY - FIRST 15 MIN: Performed by: PLASTIC SURGERY

## 2021-01-11 PROCEDURE — 7100000041 HC SPAR PHASE II RECOVERY - ADDTL 15 MIN: Performed by: PLASTIC SURGERY

## 2021-01-11 PROCEDURE — 2500000003 HC RX 250 WO HCPCS: Performed by: PLASTIC SURGERY

## 2021-01-11 PROCEDURE — 7100000001 HC PACU RECOVERY - ADDTL 15 MIN: Performed by: PLASTIC SURGERY

## 2021-01-11 PROCEDURE — 2709999900 HC NON-CHARGEABLE SUPPLY: Performed by: PLASTIC SURGERY

## 2021-01-11 PROCEDURE — 6360000002 HC RX W HCPCS: Performed by: SPECIALIST

## 2021-01-11 RX ORDER — SODIUM CHLORIDE, SODIUM LACTATE, POTASSIUM CHLORIDE, CALCIUM CHLORIDE 600; 310; 30; 20 MG/100ML; MG/100ML; MG/100ML; MG/100ML
INJECTION, SOLUTION INTRAVENOUS CONTINUOUS
Status: DISCONTINUED | OUTPATIENT
Start: 2021-01-11 | End: 2021-01-11 | Stop reason: HOSPADM

## 2021-01-11 RX ORDER — MIDAZOLAM HYDROCHLORIDE 2 MG/2ML
2 INJECTION, SOLUTION INTRAMUSCULAR; INTRAVENOUS ONCE
Status: DISCONTINUED | OUTPATIENT
Start: 2021-01-11 | End: 2021-01-11 | Stop reason: HOSPADM

## 2021-01-11 RX ORDER — BUPIVACAINE HYDROCHLORIDE 2.5 MG/ML
INJECTION, SOLUTION INFILTRATION; PERINEURAL PRN
Status: DISCONTINUED | OUTPATIENT
Start: 2021-01-11 | End: 2021-01-11 | Stop reason: ALTCHOICE

## 2021-01-11 RX ORDER — FENTANYL CITRATE 50 UG/ML
25 INJECTION, SOLUTION INTRAMUSCULAR; INTRAVENOUS EVERY 5 MIN PRN
Status: DISCONTINUED | OUTPATIENT
Start: 2021-01-11 | End: 2021-01-11 | Stop reason: HOSPADM

## 2021-01-11 RX ORDER — PROPOFOL 10 MG/ML
INJECTION, EMULSION INTRAVENOUS PRN
Status: DISCONTINUED | OUTPATIENT
Start: 2021-01-11 | End: 2021-01-11 | Stop reason: SDUPTHER

## 2021-01-11 RX ORDER — CEFAZOLIN SODIUM 2 G/50ML
SOLUTION INTRAVENOUS PRN
Status: DISCONTINUED | OUTPATIENT
Start: 2021-01-11 | End: 2021-01-11 | Stop reason: SDUPTHER

## 2021-01-11 RX ORDER — HYDROCODONE BITARTRATE AND ACETAMINOPHEN 5; 325 MG/1; MG/1
1 TABLET ORAL PRN
Status: COMPLETED | OUTPATIENT
Start: 2021-01-11 | End: 2021-01-11

## 2021-01-11 RX ORDER — MAGNESIUM HYDROXIDE 1200 MG/15ML
LIQUID ORAL CONTINUOUS PRN
Status: COMPLETED | OUTPATIENT
Start: 2021-01-11 | End: 2021-01-11

## 2021-01-11 RX ORDER — HYDROCODONE BITARTRATE AND ACETAMINOPHEN 5; 325 MG/1; MG/1
2 TABLET ORAL PRN
Status: COMPLETED | OUTPATIENT
Start: 2021-01-11 | End: 2021-01-11

## 2021-01-11 RX ORDER — ONDANSETRON 2 MG/ML
INJECTION INTRAMUSCULAR; INTRAVENOUS PRN
Status: DISCONTINUED | OUTPATIENT
Start: 2021-01-11 | End: 2021-01-11 | Stop reason: SDUPTHER

## 2021-01-11 RX ORDER — LIDOCAINE HYDROCHLORIDE 10 MG/ML
INJECTION, SOLUTION EPIDURAL; INFILTRATION; INTRACAUDAL; PERINEURAL PRN
Status: DISCONTINUED | OUTPATIENT
Start: 2021-01-11 | End: 2021-01-11 | Stop reason: SDUPTHER

## 2021-01-11 RX ORDER — FENTANYL CITRATE 50 UG/ML
50 INJECTION, SOLUTION INTRAMUSCULAR; INTRAVENOUS EVERY 5 MIN PRN
Status: DISCONTINUED | OUTPATIENT
Start: 2021-01-11 | End: 2021-01-11 | Stop reason: HOSPADM

## 2021-01-11 RX ORDER — DEXAMETHASONE SODIUM PHOSPHATE 4 MG/ML
INJECTION, SOLUTION INTRA-ARTICULAR; INTRALESIONAL; INTRAMUSCULAR; INTRAVENOUS; SOFT TISSUE PRN
Status: DISCONTINUED | OUTPATIENT
Start: 2021-01-11 | End: 2021-01-11 | Stop reason: SDUPTHER

## 2021-01-11 RX ORDER — FENTANYL CITRATE 50 UG/ML
INJECTION, SOLUTION INTRAMUSCULAR; INTRAVENOUS PRN
Status: DISCONTINUED | OUTPATIENT
Start: 2021-01-11 | End: 2021-01-11 | Stop reason: SDUPTHER

## 2021-01-11 RX ADMIN — ONDANSETRON 4 MG: 2 INJECTION INTRAMUSCULAR; INTRAVENOUS at 12:38

## 2021-01-11 RX ADMIN — FENTANYL CITRATE 100 MCG: 50 INJECTION, SOLUTION INTRAMUSCULAR; INTRAVENOUS at 12:24

## 2021-01-11 RX ADMIN — SODIUM CHLORIDE, POTASSIUM CHLORIDE, SODIUM LACTATE AND CALCIUM CHLORIDE: 600; 310; 30; 20 INJECTION, SOLUTION INTRAVENOUS at 12:13

## 2021-01-11 RX ADMIN — SODIUM CHLORIDE, POTASSIUM CHLORIDE, SODIUM LACTATE AND CALCIUM CHLORIDE: 600; 310; 30; 20 INJECTION, SOLUTION INTRAVENOUS at 11:19

## 2021-01-11 RX ADMIN — FENTANYL CITRATE 50 MCG: 50 INJECTION, SOLUTION INTRAMUSCULAR; INTRAVENOUS at 13:30

## 2021-01-11 RX ADMIN — FENTANYL CITRATE 50 MCG: 50 INJECTION, SOLUTION INTRAMUSCULAR; INTRAVENOUS at 13:35

## 2021-01-11 RX ADMIN — LIDOCAINE HYDROCHLORIDE 50 MG: 10 INJECTION, SOLUTION EPIDURAL; INFILTRATION; INTRACAUDAL; PERINEURAL at 12:24

## 2021-01-11 RX ADMIN — PROPOFOL 300 MG: 10 INJECTION, EMULSION INTRAVENOUS at 12:24

## 2021-01-11 RX ADMIN — CEFAZOLIN SODIUM 2 G: 2 SOLUTION INTRAVENOUS at 12:32

## 2021-01-11 RX ADMIN — FENTANYL CITRATE 50 MCG: 50 INJECTION, SOLUTION INTRAMUSCULAR; INTRAVENOUS at 12:43

## 2021-01-11 RX ADMIN — DEXAMETHASONE SODIUM PHOSPHATE 10 MG: 4 INJECTION, SOLUTION INTRAMUSCULAR; INTRAVENOUS at 12:38

## 2021-01-11 RX ADMIN — HYDROCODONE BITARTRATE AND ACETAMINOPHEN 2 TABLET: 5; 325 TABLET ORAL at 13:43

## 2021-01-11 ASSESSMENT — PAIN DESCRIPTION - LOCATION
LOCATION: HAND

## 2021-01-11 ASSESSMENT — PULMONARY FUNCTION TESTS
PIF_VALUE: 17
PIF_VALUE: 2
PIF_VALUE: 1
PIF_VALUE: 2
PIF_VALUE: 3
PIF_VALUE: 2
PIF_VALUE: 17
PIF_VALUE: 3
PIF_VALUE: 14
PIF_VALUE: 1
PIF_VALUE: 3
PIF_VALUE: 2
PIF_VALUE: 3
PIF_VALUE: 10
PIF_VALUE: 25
PIF_VALUE: 2
PIF_VALUE: 1
PIF_VALUE: 15
PIF_VALUE: 28
PIF_VALUE: 3

## 2021-01-11 ASSESSMENT — PAIN DESCRIPTION - ORIENTATION: ORIENTATION: RIGHT

## 2021-01-11 ASSESSMENT — PAIN DESCRIPTION - DESCRIPTORS
DESCRIPTORS: DISCOMFORT
DESCRIPTORS: DISCOMFORT

## 2021-01-11 ASSESSMENT — PAIN - FUNCTIONAL ASSESSMENT: PAIN_FUNCTIONAL_ASSESSMENT: 0-10

## 2021-01-11 ASSESSMENT — PAIN DESCRIPTION - PAIN TYPE
TYPE: SURGICAL PAIN

## 2021-01-11 ASSESSMENT — PAIN SCALES - GENERAL: PAINLEVEL_OUTOF10: 7

## 2021-01-11 ASSESSMENT — PAIN SCALES - WONG BAKER: WONGBAKER_NUMERICALRESPONSE: 0

## 2021-01-11 NOTE — BRIEF OP NOTE
Brief Postoperative Note      Patient: Stephanie Bean  YOB: 2000  MRN: [id number]    Date of Procedure: 1/11/2021    Pre-Op Diagnosis: TENDON LACERATION RIGHT LONG FINGER    Post-Op Diagnosis: TENDON LACERATION RIGHT LONG  AND RING FINGERS       PROCEDURE: REPAIR TENDON LACERATION RIGHT LONG  AND RING FINGERS    Surgeon(s):  Abdi Conde MD    Assistant:  * No surgical staff found *    Anesthesia: General    Estimated Blood Loss (mL): Minimal    Complications: None    Specimens:   * No specimens in log *    Implants:  * No implants in log *      Drains: * No LDAs found *    Findings:     Electronically signed by Abdi Conde MD on 1/11/2021 at 1:02 PM

## 2021-01-11 NOTE — ANESTHESIA PRE PROCEDURE
Department of Anesthesiology  Preprocedure Note       Name:  Anum Stein   Age:  24 y.o.  :  2000                                          MRN:  [id number]         Date:  2021      Surgeon: Mick Curry):  Adán Chicas MD    Procedure: Procedure(s):  REPAIR EXTENSOR TENDON LONG FINGER    Medications prior to admission:   Prior to Admission medications    Medication Sig Start Date End Date Taking? Authorizing Provider   metoclopramide (REGLAN) 10 MG tablet Take 1 tablet by mouth once for 1 dose Take morning of surgery with SMALL SIP of water. 21 Yes Adán Chicas MD   famotidine (PEPCID) 20 MG tablet Take 1 tablet by mouth once for 1 dose Take morning of surgery with minimal sip of water. 21 Yes Adán Chicas MD   acetaminophen (TYLENOL) 325 MG tablet Take 650 mg by mouth every 6 hours as needed for Pain  1/3/21  Historical Provider, MD       Current medications:    No current facility-administered medications for this encounter. Allergies:  No Known Allergies    Problem List:    Patient Active Problem List   Diagnosis Code    Strep pharyngitis J02.0    Knee pain M25.569    Laceration of extensor muscle, fascia and tendon of right middle finger at wrist and hand level, initial encounter C78.249X       Past Medical History:        Diagnosis Date    ADHD (attention deficit hyperactivity disorder)        Past Surgical History:  History reviewed. No pertinent surgical history. Social History:    Social History     Tobacco Use    Smoking status: Passive Smoke Exposure - Never Smoker    Smokeless tobacco: Never Used   Substance Use Topics    Alcohol use:  No                                Counseling given: Not Answered      Vital Signs (Current):   Vitals:    21 1055 21 1108   BP:  (!) 153/82   Pulse:  84   Resp:  16   Temp:  97 °F (36.1 °C)   TempSrc:  Temporal   SpO2:  98%   Weight: 249 lb 1.9 oz (113 kg)    Height: 6' (1.829 m) COVID19 Not Detected 01/07/2021         Anesthesia Evaluation    Airway: Mallampati: II     Neck ROM: full   Dental: normal exam         Pulmonary: breath sounds clear to auscultation  (+) sleep apnea:                             Cardiovascular:Negative CV ROS            Rhythm: regular  Rate: normal                    Neuro/Psych:   (+) psychiatric history:            GI/Hepatic/Renal: Neg GI/Hepatic/Renal ROS            Endo/Other: Negative Endo/Other ROS                    Abdominal:   (+) obese,         Vascular: negative vascular ROS. Anesthesia Plan      general     ASA 2       Induction: intravenous. Anesthetic plan and risks discussed with patient and mother. Plan discussed with CRNA.                   Suzi Orozco MD   1/11/2021

## 2021-01-11 NOTE — ANESTHESIA POSTPROCEDURE EVALUATION
Department of Anesthesiology  Postprocedure Note    Patient: Johnny Wallace  MRN: [id number]  YOB: 2000  Date of evaluation: 1/11/2021  Time:  2:36 PM     Procedure Summary     Date: 01/11/21 Room / Location: [address]    Anesthesia Start: 5462 Anesthesia Stop: 7290    Procedure: REPAIR EXTENSOR TENDON LONG FINGER (Right ) Diagnosis: (TENDON LACERATION RIGHT LONG FINGER)    Surgeons: Alma Delia Woodson MD Responsible Provider: Paul Dominguez MD    Anesthesia Type: general ASA Status: 2          Anesthesia Type: general    Jarod Phase I: Jarod Score: 10    Jarod Phase II: Jarod Score: 10    Last vitals: Reviewed and per EMR flowsheets.        Anesthesia Post Evaluation    Patient location during evaluation: PACU  Patient participation: complete - patient participated  Level of consciousness: awake and alert  Pain score: 2  Airway patency: patent  Nausea & Vomiting: no vomiting and no nausea  Complications: no  Cardiovascular status: hemodynamically stable  Respiratory status: acceptable  Hydration status: stable

## 2021-01-11 NOTE — H&P
History and Physical    Pt Name: Valeda Goldberg  MRN: [id number]  YOB: 2000  Date of evaluation: 1/11/2021  Primary Care Physician: No primary care provider on file. SUBJECTIVE:   History of Chief Complaint:    Valeda Goldberg is a 24 y.o. male who is scheduled today for [address]. He reports punching a mirror on 1/2/21. He rates his current pain to the affected area a 3/10. He is right hand dominant . Allergies  has No Known Allergies. Medications  Prior to Admission medications    Medication Sig Start Date End Date Taking? Authorizing Provider   metoclopramide (REGLAN) 10 MG tablet Take 1 tablet by mouth once for 1 dose Take morning of surgery with SMALL SIP of water. 1/6/21 1/11/21 Yes Zahira Askew MD   famotidine (PEPCID) 20 MG tablet Take 1 tablet by mouth once for 1 dose Take morning of surgery with minimal sip of water. 1/6/21 1/11/21 Yes Zahira Askew MD   acetaminophen (TYLENOL) 325 MG tablet Take 650 mg by mouth every 6 hours as needed for Pain  1/3/21  Historical Provider, MD     Past Medical History    has a past medical history of ADHD (attention deficit hyperactivity disorder) and Laceration of right hand. Past Surgical History   has no past surgical history on file. Social History   reports that he is a non-smoker but has been exposed to tobacco smoke. He has never used smokeless tobacco.   reports no history of alcohol use. reports no history of drug use. Marital Status single  Children none  Occupation none  Family History  Family Status   Relation Name Status    Mother  Frances Allen Father  Allen Pereira     family history includes Diabetes in his mother; Sleep Apnea in his father. OBJECTIVE:   VITALS:  height is 6' (1.829 m) and weight is 249 lb 1.9 oz (113 kg). His temporal temperature is 97 °F (36.1 °C). His blood pressure is 153/82 (abnormal) and his pulse is 84. His respiration is 16 and oxygen saturation is 98%. CONSTITUTIONAL:Alert and orientated to person, place and time. No acute distress. Friendly. SKIN:  Warm & dry, no rashes on exposed skin,  HEENT: HEAD: Normocephalic, atraumatic        EYES:  PERRL, EOMs intact, conjunctiva clear      EARS:  Equal bilaterally, no edema or thickening, skin is intact without lumps or lesions. No discharge. NOSE:  Nares patent, septum midline, no rhinorrhea      MOUTH/THROAT:  Mucous membranes moist, tongue is pink, uvula midline, teeth appear to be intact  NECK:  Supple, no lymphadenopathy, full ROM  LUNGS: Respirations even and non-labored. Clear to auscultation bilaterally, no wheezes/rales/rhonchi   CARDIOVASCULAR: regular rate and rhythm, no murmurs/rubs/gallops   ABDOMEN: soft, non-tender, non-distended, bowel sounds active x 4, rotund  MUSCULOSKELETAL: Full ROM LUE. Right hand in ace wrap, sensation intact right finger Full ROM bilateral lower extremities. Strength 5/5 bilateral lower extremities. VASCULAR:  Brisk cap refill bilateral fingers. Radial pulses are intact, 2+ bilaterally. Dorsalis pedis pulse 2+ bilaterally. No edema or varicosities bilateral lower extremities  NEUROLOGIC: CN II-XII are grossly intact. Gait not assessed. IMPRESSIONS:   TENDON LACERATION RIGHT LONG FINGER      Diagnosis Date    ADHD (attention deficit hyperactivity disorder)     Laceration of right hand 01/02/2021    punched a mirror   1.    PLANS:   REPAIR EXTENSOR TENDON LONG FINGER - Right        SARAHI HOLLINGSWORTH  Electronically signed 1/11/2021 at 11:40 AM

## 2021-01-13 NOTE — OP NOTE
[address]                  [facility] [address]                                OPERATIVE REPORT    PATIENT NAME: Jl Negrete                   :        2000  MED REC NO:   [id number]                             ROOM:  ACCOUNT NO:   [de-identified]                           ADMIT DATE: 2021  PROVIDER:     Devika Davis    DATE OF PROCEDURE:  2021    ATTENDING PHYSICIAN AND SURGEON:  Devika Davis MD    PREOPERATIVE DIAGNOSIS:  Laceration of right hand. POSTOPERATIVE DIAGNOSIS:  Laceration of right hand with 100% transection  of the right long finger extensor tendon and 50% of the ring finger  extensor tendon. PROCEDURE:  Repair of extensor tendons to right long and ring fingers. ANESTHESIA:  General.    INDICATIONS:  The patient is a 67-year-old male who punched a mirror,  suffering a laceration of the dorsum of the right hand with the  above-listed injuries. The procedure, the risks, the benefits were  discussed with him. All questions answered to his satisfaction. Consent was signed. NARRATIVE SUMMARY:  The patient was brought to the operating suite,  placed under general anesthetic in the supine position. The right arm  was then prepped and draped in the usual sterile fashion and laid out  onto the armboard. The sutures placed in the ER were now removed. The  wound was then opened and copiously irrigated out of clots and debris. The long finger distal extensor tendon was immediately identified. The  proximal was not. The incision was then extended proximally, and the  proximal end of the extensor tendon was identified. Also, noted was 50%  transection of the extensor to the ring finger. The tendons were then  brought up into apposition. They were repaired with modified Chris  sutures of 3-0 Surgilon with good end-to-end repair.   The wounds were irrigated out with saline. A field block was placed with 0.25% Marcaine  plain to assist with postoperative pain control. Skin was closed with  interrupted mattress sutures of 4-0 black nylon. He was dressed with  Adaptic, fluff gauze, Kerlix wrap, and an one-step splint for  immobilization. The patient tolerated the procedure well. Blood loss:   Minimal.  Specimens:  None. Complications:  None. The patient was  transferred to Recovery in stable condition.         Nash Krabbe    D: 01/12/2021 12:49:49       T: 01/12/2021 12:54:58     [id number]  Job#: [id number]     Doc#: [id number]    CC:

## 2021-01-21 PROBLEM — S66.324A: Status: ACTIVE | Noted: 2021-01-21

## 2022-02-23 ENCOUNTER — APPOINTMENT (OUTPATIENT)
Dept: GENERAL RADIOLOGY | Age: 22
End: 2022-02-23
Payer: MEDICAID

## 2022-02-23 ENCOUNTER — HOSPITAL ENCOUNTER (EMERGENCY)
Age: 22
Discharge: HOME | End: 2022-02-24
Attending: EMERGENCY MEDICINE
Payer: MEDICAID

## 2022-02-23 VITALS
SYSTOLIC BLOOD PRESSURE: 111 MMHG | DIASTOLIC BLOOD PRESSURE: 82 MMHG | WEIGHT: 215 LBS | HEART RATE: 95 BPM | TEMPERATURE: 99.6 F | OXYGEN SATURATION: 99 % | RESPIRATION RATE: 18 BRPM | BODY MASS INDEX: 29.16 KG/M2

## 2022-02-23 DIAGNOSIS — B34.9 VIRAL ILLNESS: Primary | ICD-10-CM

## 2022-02-23 LAB
DIRECT EXAM: NORMAL
INFLUENZA A: NOT DETECTED
INFLUENZA B: NOT DETECTED
SARS-COV-2 RNA, RT PCR: NOT DETECTED
SOURCE: NORMAL
SPECIMEN DESCRIPTION: NORMAL
SPECIMEN DESCRIPTION: NORMAL

## 2022-02-23 PROCEDURE — 99284 EMERGENCY DEPT VISIT MOD MDM: CPT

## 2022-02-23 PROCEDURE — 87880 STREP A ASSAY W/OPTIC: CPT

## 2022-02-23 PROCEDURE — 96372 THER/PROPH/DIAG INJ SC/IM: CPT

## 2022-02-23 PROCEDURE — 71045 X-RAY EXAM CHEST 1 VIEW: CPT

## 2022-02-23 PROCEDURE — 87636 SARSCOV2 & INF A&B AMP PRB: CPT

## 2022-02-23 PROCEDURE — 6360000002 HC RX W HCPCS: Performed by: EMERGENCY MEDICINE

## 2022-02-23 RX ORDER — KETOROLAC TROMETHAMINE 30 MG/ML
30 INJECTION, SOLUTION INTRAMUSCULAR; INTRAVENOUS ONCE
Status: COMPLETED | OUTPATIENT
Start: 2022-02-23 | End: 2022-02-23

## 2022-02-23 RX ADMIN — KETOROLAC TROMETHAMINE 30 MG: 30 INJECTION, SOLUTION INTRAMUSCULAR; INTRAVENOUS at 22:38

## 2022-02-23 ASSESSMENT — PAIN SCALES - GENERAL
PAINLEVEL_OUTOF10: 8
PAINLEVEL_OUTOF10: 7

## 2022-02-23 ASSESSMENT — PAIN - FUNCTIONAL ASSESSMENT: PAIN_FUNCTIONAL_ASSESSMENT: 0-10

## 2022-02-23 ASSESSMENT — PAIN DESCRIPTION - FREQUENCY: FREQUENCY: CONTINUOUS

## 2022-02-23 ASSESSMENT — PAIN DESCRIPTION - LOCATION: LOCATION: GENERALIZED

## 2022-02-23 ASSESSMENT — PAIN DESCRIPTION - DESCRIPTORS: DESCRIPTORS: DISCOMFORT

## 2022-02-23 ASSESSMENT — PAIN DESCRIPTION - PAIN TYPE: TYPE: ACUTE PAIN

## 2022-02-23 NOTE — Clinical Note
Ernesto Sanchez was seen and treated in our emergency department on 2/23/2022. He may return to work on 02/26/2022. If you have any questions or concerns, please don't hesitate to call.       [address], DO

## 2022-02-23 NOTE — Clinical Note
Ria Crigler was seen and treated in our emergency department on 2/23/2022. He may return to work on 02/26/2022. If you have any questions or concerns, please don't hesitate to call.       Alex Davenport, DO

## 2022-02-24 ASSESSMENT — ENCOUNTER SYMPTOMS
EYE PAIN: 0
COUGH: 1
BACK PAIN: 0
SHORTNESS OF BREATH: 0
ABDOMINAL PAIN: 0
COLOR CHANGE: 0
SORE THROAT: 1

## 2022-02-24 NOTE — ED PROVIDER NOTES
EMERGENCY DEPARTMENT ENCOUNTER    Pt Name: Pratima Gamez  MRN: [id number]  [place] 2000  Date of evaluation: 2/24/22  CHIEF COMPLAINT       Chief Complaint   Patient presents with    Generalized Body Aches     HISTORY OF PRESENT ILLNESS   20-year-old male presents for complaint of nasal congestion, cough, fatigue and body aches. Patient reports symptoms for the last couple days, states that he was feeling worse tonight and presented for evaluation. Patient denies any associated chest pain or shortness of breath, denies any fevers at home, reports brother has been sick with similar symptoms. Denies any associated abdominal pain nausea or vomiting. The history is provided by the patient. REVIEW OF SYSTEMS     Review of Systems   Constitutional: Negative for chills and fever. HENT: Positive for congestion and sore throat. Negative for ear pain. Eyes: Negative for pain. Respiratory: Positive for cough. Negative for shortness of breath. Cardiovascular: Negative for chest pain, palpitations and leg swelling. Gastrointestinal: Negative for abdominal pain. Genitourinary: Negative for dysuria and flank pain. Musculoskeletal: Negative for back pain. Skin: Negative for color change. Neurological: Negative for numbness and headaches. Psychiatric/Behavioral: Negative for confusion. All other systems reviewed and are negative.     PASTMEDICAL HISTORY     Past Medical History:   Diagnosis Date    ADHD (attention deficit hyperactivity disorder)     Laceration of right hand 01/02/2021    punched a mirror, right long finger     Past Problem List  Patient Active Problem List   Diagnosis Code    Strep pharyngitis J02.0    Knee pain M25.569    Laceration of extensor muscle, fascia and tendon of right middle finger at wrist and hand level, initial encounter W67.065Y    Laceration of extensor muscle, fascia and tendon of right ring finger at wrist and hand level, initial encounter H95.550M     SURGICAL HISTORY       Past Surgical History:   Procedure Laterality Date    FINGER SURGERY Right 1/11/2021    REPAIR EXTENSOR TENDON LONG FINGER performed by Ya Torres MD at [address] Right 01/11/2021    REPAIR EXTENSOR TENDON LONG FINGER (Right )     CURRENT MEDICATIONS       Discharge Medication List as of 2/24/2022 12:16 AM      CONTINUE these medications which have NOT CHANGED    Details   HYDROcodone-acetaminophen (NORCO) 5-325 MG per tablet Take 1 tablet by mouth nightly as needed. Historical Med           ALLERGIES     has No Known Allergies. FAMILY HISTORY     He indicated that his mother is alive. He indicated that his father is alive. SOCIAL HISTORY       Social History     Tobacco Use    Smoking status: Never Smoker    Smokeless tobacco: Never Used   Vaping Use    Vaping Use: Never used   Substance Use Topics    Alcohol use: No    Drug use: Yes     Types: Marijuana (Weed)     PHYSICAL EXAM     INITIAL VITALS: /82   Pulse 95   Temp 99.6 °F (37.6 °C) (Oral)   Resp 18   Wt 215 lb (97.5 kg)   SpO2 99%   BMI 29.16 kg/m²    Physical Exam  Vitals and nursing note reviewed. Constitutional:       Appearance: Normal appearance. HENT:      Head: Normocephalic and atraumatic. Right Ear: External ear normal.      Left Ear: External ear normal.      Nose: Congestion present. Mouth/Throat:      Mouth: Mucous membranes are moist.      Pharynx: Oropharynx is clear. Posterior oropharyngeal erythema present. Eyes:      Pupils: Pupils are equal, round, and reactive to light. Cardiovascular:      Rate and Rhythm: Normal rate and regular rhythm. Pulses: Normal pulses. Heart sounds: Normal heart sounds. Pulmonary:      Effort: Pulmonary effort is normal.      Breath sounds: Normal breath sounds. Abdominal:      General: Abdomen is flat. Palpations: Abdomen is soft. Tenderness: There is no abdominal tenderness. Musculoskeletal:         General: No tenderness. Normal range of motion. Cervical back: Neck supple. Skin:     General: Skin is warm and dry. Capillary Refill: Capillary refill takes less than 2 seconds. Neurological:      General: No focal deficit present. Mental Status: He is alert and oriented to person, place, and time. Psychiatric:         Behavior: Behavior normal.         MEDICAL DECISION MAKIN-year-old male presents for body aches, cough, fatigue and nasal congestion. On initial exam patient in no acute distress vitals are stable, symptoms concerning for possible viral illness, will check rapid flu Covid testing as well as chest x-ray    Flu Covid and strep test were negative, x-ray was negative    Patient reevaluated after medication reports feeling better, discussed with patient likely viral illness, discussed continued supportive care, discussed need for follow-up with PCP and return precautions, patient voiced understanding is comfortable plan and discharge home    Patient/Guardian was informed of their diagnosis and told to follow up with PCP  in 1-3 days. Patient demonstrates understanding and agreement with the plan. They were given the opportunity to ask questions and those questions were answered to the best of our ability with the available information. Patient/Guardian told to return to the ED for any new, worsening, changing or persistent symptoms. This dictation was prepared using [employer] voice recognition software. CRITICAL CARE:       PROCEDURES:    Procedures    DIAGNOSTIC RESULTS   EKG:All EKG's are interpreted by the Emergency Department Physician who either signs or Co-signs this chart in the absence of a cardiologist.        RADIOLOGY:All plain film, CT, MRI, and formal ultrasound images (except ED bedside ultrasound) are read by the radiologist, see reports below, unless otherwisenoted in MDM or here.   XR CHEST PORTABLE   Final Result

## 2022-03-03 ENCOUNTER — HOSPITAL ENCOUNTER (EMERGENCY)
Age: 22
Discharge: HOME | End: 2022-03-03
Attending: EMERGENCY MEDICINE
Payer: MEDICAID

## 2022-03-03 ENCOUNTER — APPOINTMENT (OUTPATIENT)
Dept: GENERAL RADIOLOGY | Age: 22
End: 2022-03-03
Payer: MEDICAID

## 2022-03-03 VITALS
TEMPERATURE: 98.6 F | HEART RATE: 91 BPM | DIASTOLIC BLOOD PRESSURE: 103 MMHG | OXYGEN SATURATION: 99 % | SYSTOLIC BLOOD PRESSURE: 149 MMHG | RESPIRATION RATE: 18 BRPM

## 2022-03-03 DIAGNOSIS — J06.9 ACUTE UPPER RESPIRATORY INFECTION: Primary | ICD-10-CM

## 2022-03-03 LAB
INFLUENZA A: NOT DETECTED
INFLUENZA B: NOT DETECTED
SARS-COV-2 RNA, RT PCR: NOT DETECTED
SOURCE: NORMAL
SPECIMEN DESCRIPTION: NORMAL

## 2022-03-03 PROCEDURE — 71045 X-RAY EXAM CHEST 1 VIEW: CPT

## 2022-03-03 PROCEDURE — 6370000000 HC RX 637 (ALT 250 FOR IP): Performed by: PHYSICIAN ASSISTANT

## 2022-03-03 PROCEDURE — 99283 EMERGENCY DEPT VISIT LOW MDM: CPT

## 2022-03-03 PROCEDURE — 87636 SARSCOV2 & INF A&B AMP PRB: CPT

## 2022-03-03 RX ORDER — AZITHROMYCIN 250 MG/1
TABLET, FILM COATED ORAL
Qty: 1 PACKET | Refills: 0 | Status: SHIPPED | OUTPATIENT
Start: 2022-03-03 | End: 2022-03-07

## 2022-03-03 RX ORDER — BENZONATATE 100 MG/1
100 CAPSULE ORAL 2 TIMES DAILY PRN
Qty: 20 CAPSULE | Refills: 0 | Status: SHIPPED | OUTPATIENT
Start: 2022-03-03

## 2022-03-03 RX ORDER — IBUPROFEN 800 MG/1
800 TABLET ORAL ONCE
Status: COMPLETED | OUTPATIENT
Start: 2022-03-03 | End: 2022-03-03

## 2022-03-03 RX ADMIN — IBUPROFEN 800 MG: 800 TABLET, FILM COATED ORAL at 16:48

## 2022-03-03 ASSESSMENT — PAIN SCALES - GENERAL: PAINLEVEL_OUTOF10: 9

## 2022-03-03 NOTE — ED PROVIDER NOTES
[address]  eMERGENCY dEPARTMENT eNCOUnter      Pt Name: Maurisio Moss  MRN: [id number]  [place] 2000  Date of evaluation: 3/3/2022  Provider: Baljit Rea PA-C    CHIEF COMPLAINT       Chief Complaint   Patient presents with    Nasal Congestion     Patient complains of nasal congestion, left ear fullness and pain, and body aches last week. HISTORY OF PRESENT ILLNESS  (Location/Symptom, Timing/Onset, Context/Setting, Quality, Duration, Modifying Factors, Severity.)   Maurisio Moss is a 25 y.o. male who presents to the emergency department for evaluation of nasal congestion, left ear fullness, body aches, headache, chills, cough, and sore throat x 8 days. Pt denies fever, chest pain, sob, nausea, emesis, abd pain. Pt has tried tylenol cold and flu. Was evaluated on 2/23. Had a negative covid, flu, strep and cxr. Pt feels symptoms are worsening. He did have to leave work. No other complaints. Nursing Notes were reviewed. REVIEW OF SYSTEMS    (2-9 systems for level 4, 10 or more for level 5)     Review of Systems   nasal congestion, left ear fullness, body aches, headache, chills, cough, and sore throa    Except as noted above the remainder of the review of systems was reviewed and negative.        PAST MEDICAL HISTORY     Past Medical History:   Diagnosis Date    ADHD (attention deficit hyperactivity disorder)     Laceration of right hand 01/02/2021    punched a mirror, right long finger     None otherwise stated in nurses notes    SURGICAL HISTORY       Past Surgical History:   Procedure Laterality Date    FINGER SURGERY Right 1/11/2021    REPAIR EXTENSOR TENDON LONG FINGER performed by Anni Hua MD at [address] Right 01/11/2021    REPAIR EXTENSOR TENDON LONG FINGER (Right )     None otherwise stated in nurses notes    CURRENT MEDICATIONS       Previous Medications    HYDROCODONE-ACETAMINOPHEN (NORCO) 5-325 MG PER TABLET    Take 1 tablet by mouth nightly as needed. ALLERGIES     Patient has no known allergies. FAMILY HISTORY           Problem Relation Age of Onset    Diabetes Mother     Sleep Apnea Father      Family Status   Relation Name Status    Mother  Alive    Father  Alive      None otherwise stated in nurses notes    SOCIAL HISTORY      reports that he has never smoked. He has never used smokeless tobacco. He reports current drug use. Drug: Marijuana [employer]ma Marts). He reports that he does not drink alcohol. lives at home with others     PHYSICAL EXAM    (up to 7 for level 4, 8 or more for level 5)     ED Triage Vitals [03/03/22 1625]   BP Temp Temp src Pulse Resp SpO2 Height Weight   (!) 149/103 98.6 °F (37 °C) -- 91 18 99 % -- --       Physical Exam   Nursing note and vitals reviewed. Constitutional: Oriented to person, place, and time and well-developed, well-nourished. Head: Normocephalic and atraumatic. Ear: External ears normal. Right ear WNL. Left TM is dull. There is no erythema. No canal erythema or swelling. No mastoid tenderness. Nose: Nose normal and midline. Eyes: Conjunctivae and EOM are normal. Pupils are equal, round, and reactive to light. Neck: Normal range of motion. Neck supple. Throat: Posterior pharynx is erythematous with no exudates. Uvula midline. Airway patent. No swelling. cardiovascular: Normal rate, regular rhythm, normal heart sounds and intact distal pulses. Pulmonary/Chest: Effort normal and breath sounds normal. No respiratory distress. Mild wheezing. No rhonchi. No rales. No chest tenderness. Abdominal: Soft. No distension and no mass. There is no tenderness. There is no rebound and no guarding. Musculoskeletal: Normal range of motion. Neurological: Alert and oriented to person, place, and time. GCS score is 15. Skin: Skin is warm and dry. No rash noted. No erythema. No pallor.    Psychiatric: Mood, memory, affect and judgment normal.           DIAGNOSTIC RESULTS     EKG: All EKG's are interpreted by the Emergency Department Physician who either signs or Co-signs this chart in the absence of a cardiologist.        RADIOLOGY:   All plain film, CT, MRI, and formal ultrasound images (except ED bedside ultrasound) are read by the radiologist, see reports below, unless otherwise noted in MDM or here. XR CHEST PORTABLE   Final Result   Unremarkable chest.             XR CHEST PORTABLE    Result Date: 2/23/2022  EXAMINATION: ONE XRAY VIEW OF THE CHEST 2/23/2022 11:53 pm COMPARISON: 11/14/2016 HISTORY: ORDERING SYSTEM PROVIDED HISTORY: cough TECHNOLOGIST PROVIDED HISTORY: cough Reason for Exam: cough Additional signs and symptoms: cough Relevant Medical/Surgical History: cough FINDINGS: Cardiomediastinal silhouette is normal in size. There is no pleural effusion or pneumothorax. There is no pulmonary consolidation. There is no acute osseous abnormality. No acute cardiopulmonary abnormality. LABS:  Labs Reviewed   COVID-19 & INFLUENZA COMBO       All other labs were within normal range or not returned as of this dictation. EMERGENCY DEPARTMENT COURSE and DIFFERENTIAL DIAGNOSIS/MDM:   Vitals:    Vitals:    03/03/22 1625   BP: (!) 149/103   Pulse: 91   Resp: 18   Temp: 98.6 °F (37 °C)   SpO2: 99%         Patient instructed to return to the emergency room if symptoms worsen, return, or any other concern right away which is agreed by the patient    ED MEDS:  Orders Placed This Encounter   Medications    ibuprofen (ADVIL;MOTRIN) tablet 800 mg    benzonatate (TESSALON) 100 MG capsule     Sig: Take 1 capsule by mouth 2 times daily as needed for Cough     Dispense:  20 capsule     Refill:  0    azithromycin (ZITHROMAX Z-MYRA) 250 MG tablet     Sig: Take 2 tablets (500 mg) on Day 1, and then take 1 tablet (250 mg) on days 2 through 5. Dispense:  1 packet     Refill:  0         CONSULTS:  None    PROCEDURES:  None      FINAL IMPRESSION      1.  Acute upper respiratory infection          DISPOSITION/PLAN   DISPOSITION Decision To Discharge    PATIENT REFERRED TO:  [facility]  [facility]  [address] [address] [phone]  Call       [facility]  [place] [address] [phone]          DISCHARGE MEDICATIONS:  New Prescriptions    AZITHROMYCIN (ZITHROMAX Z-MYRA) 250 MG TABLET    Take 2 tablets (500 mg) on Day 1, and then take 1 tablet (250 mg) on days 2 through 5. BENZONATATE (TESSALON) 100 MG CAPSULE    Take 1 capsule by mouth 2 times daily as needed for Cough         Summation      Patient Course:    Chills, headache, nasal congestion, ear fullness, sore throat, cough x 8 days. Symptoms are worsening. Covid, flu are neg. cxr unremarkable. Will dc home with tessalon perles. Suspect possible viral URI but Due to length of symptoms and complaints that symptoms are worsening will start on zpak. Recommend follow up with PCP. Discussed results and plan with the pt. They expressed appropriate understanding. Pt given close follow up, supportive care instructions and strict return instructions at the bedside. The care is provided during an unprecedented national emergency due to the novel coronavirus, COVID-19. ED Medications administered this visit:    Medications   ibuprofen (ADVIL;MOTRIN) tablet 800 mg (800 mg Oral Given 3/3/22 1648)       New Prescriptions from this visit:    New Prescriptions    AZITHROMYCIN (ZITHROMAX Z-MYRA) 250 MG TABLET    Take 2 tablets (500 mg) on Day 1, and then take 1 tablet (250 mg) on days 2 through 5. BENZONATATE (TESSALON) 100 MG CAPSULE    Take 1 capsule by mouth 2 times daily as needed for Cough       Follow-up:  [facility]  [facility]  [address] [id number] [phone]  Call       [facility]  [place] [address] [phone]            Final Impression:   1.  Acute upper respiratory infection               (Please note that portions of this note were completed with a voice recognition program )        Nilo Arteaga, [address] Tomasita Aschoff, PA-C  03/03/22 [address] Tomasita Aschoff, PA-C  03/03/22 1751

## 2022-03-03 NOTE — Clinical Note
Jakob Barragan was seen and treated in our emergency department on 3/3/2022. He may return to work on 03/04/2022. If you have any questions or concerns, please don't hesitate to call.       Keyur Gomez PA-C

## 2022-03-03 NOTE — ED PROVIDER NOTES
eMERGENCY dEPARTMENT eNCOUnter   [address] Name: Adams Pierre  MRN: [id number]  [place] 2000  Date of evaluation: 3/3/22     Adams Pierre is a 25 y.o. male with CC: Nasal Congestion (Patient complains of nasal congestion, left ear fullness and pain, and body aches last week.)      Based on the medical record the care appears appropriate. I was personally available for consultation in the Emergency Department. The care is provided during an unprecedented national emergency due to the novel coronavirus, COVID 19.     Nic Vega DO  Attending Emergency Physician                  Nic Vega DO  03/03/22 1929

## 2022-12-09 ENCOUNTER — HOSPITAL ENCOUNTER (EMERGENCY)
Age: 22
Discharge: HOME | End: 2022-12-09
Attending: EMERGENCY MEDICINE
Payer: MEDICAID

## 2022-12-09 VITALS
WEIGHT: 220 LBS | OXYGEN SATURATION: 97 % | DIASTOLIC BLOOD PRESSURE: 77 MMHG | BODY MASS INDEX: 29.8 KG/M2 | HEIGHT: 72 IN | HEART RATE: 85 BPM | SYSTOLIC BLOOD PRESSURE: 145 MMHG | RESPIRATION RATE: 16 BRPM | TEMPERATURE: 98.9 F

## 2022-12-09 DIAGNOSIS — R52 BODY ACHES: Primary | ICD-10-CM

## 2022-12-09 DIAGNOSIS — B34.9 VIRAL SYNDROME: ICD-10-CM

## 2022-12-09 DIAGNOSIS — U07.1 COVID-19: ICD-10-CM

## 2022-12-09 LAB
INFLUENZA A: NOT DETECTED
INFLUENZA B: NOT DETECTED
SARS-COV-2 RNA, RT PCR: DETECTED
SOURCE: ABNORMAL
SPECIMEN DESCRIPTION: ABNORMAL

## 2022-12-09 PROCEDURE — 99283 EMERGENCY DEPT VISIT LOW MDM: CPT

## 2022-12-09 PROCEDURE — 87636 SARSCOV2 & INF A&B AMP PRB: CPT

## 2022-12-09 PROCEDURE — 6370000000 HC RX 637 (ALT 250 FOR IP): Performed by: STUDENT IN AN ORGANIZED HEALTH CARE EDUCATION/TRAINING PROGRAM

## 2022-12-09 RX ORDER — IBUPROFEN 800 MG/1
800 TABLET ORAL ONCE
Status: COMPLETED | OUTPATIENT
Start: 2022-12-09 | End: 2022-12-09

## 2022-12-09 RX ORDER — ACETAMINOPHEN 500 MG
1000 TABLET ORAL ONCE
Status: COMPLETED | OUTPATIENT
Start: 2022-12-09 | End: 2022-12-09

## 2022-12-09 RX ADMIN — IBUPROFEN 800 MG: 800 TABLET, FILM COATED ORAL at 21:41

## 2022-12-09 RX ADMIN — ACETAMINOPHEN 1000 MG: 500 TABLET, FILM COATED ORAL at 21:41

## 2022-12-09 ASSESSMENT — PAIN DESCRIPTION - DESCRIPTORS: DESCRIPTORS: ACHING

## 2022-12-09 ASSESSMENT — PAIN SCALES - GENERAL: PAINLEVEL_OUTOF10: 8

## 2022-12-09 ASSESSMENT — PAIN DESCRIPTION - LOCATION: LOCATION: GENERALIZED

## 2022-12-09 ASSESSMENT — PAIN - FUNCTIONAL ASSESSMENT: PAIN_FUNCTIONAL_ASSESSMENT: 0-10

## 2022-12-10 ASSESSMENT — ENCOUNTER SYMPTOMS
VOMITING: 0
DIARRHEA: 0
CHEST TIGHTNESS: 0
COUGH: 1
BACK PAIN: 0
TROUBLE SWALLOWING: 0
COLOR CHANGE: 0
NAUSEA: 0
SHORTNESS OF BREATH: 0
ABDOMINAL PAIN: 0

## 2022-12-10 NOTE — DISCHARGE INSTRUCTIONS
You have been seen in the ER today for COVID-19 symptoms. You tested positive for covid. Please see attached information. If you begin to experience any symptoms such as chest pain shortness of breath nausea vomiting dizziness drowsiness abdominal pain loss of consciousness or any other symptoms you find concerning please return to the ED for follow-up evaluation. If you have been given pain medication please take them only as prescribed for the your symptoms. Do not take more medication than recommended at any given time. Please follow-up with your primary care provider within 5 to 7 days for continued care, sooner if you have concerns.

## 2022-12-10 NOTE — ED PROVIDER NOTES
[address]  Emergency Department Encounter  EmergencyMedicine Resident     Pt Name:Nahun Newell  MRN: [id number]  [place] 2000  Date of evaluation: 12/9/22  PCP:  No primary care provider on file. This patient was evaluated in the Emergency Department for symptoms described in the history of present illness. The patient was evaluated in the context of the global COVID-19 pandemic, which necessitated consideration that the patient might be at risk for infection with the SARS-CoV-2 virus that causes COVID-19. Institutional protocols and algorithms that pertain to the evaluation of patients at risk for COVID-19 are in a state of rapid change based on information released by regulatory bodies including the CDC and federal and state organizations. These policies and algorithms were followed during the patient's care in the ED. CHIEF COMPLAINT       Chief Complaint   Patient presents with    Generalized Body Aches       HISTORY OF PRESENT ILLNESS  (Location/Symptom, Timing/Onset, Context/Setting, Quality, Duration, Modifying Factors, Severity.)      Thuy Sorto is a 25 y.o. male who presents with covid symptoms, tested positive for covid. Myalgias, URI sx. No abdominal pain, no acute chest pain or trouble breathing. Patient is vaccinated for COVID, states that he has been having the symptoms progressively worsening over the past week. PAST MEDICAL / SURGICAL / SOCIAL / FAMILY HISTORY      has a past medical history of ADHD (attention deficit hyperactivity disorder) and Laceration of right hand. has a past surgical history that includes Hand tendon surgery (Right, 01/11/2021) and Finger surgery (Right, 1/11/2021).       Social History     Socioeconomic History    Marital status: Single     Spouse name: Not on file    Number of children: Not on file    Years of education: Not on file    Highest education level: Not on file   Occupational History    Not on file   Tobacco Use Smoking status: Never    Smokeless tobacco: Never   Vaping Use    Vaping Use: Never used   Substance and Sexual Activity    Alcohol use: No    Drug use: Yes     Types: Marijuana Dominique Ege)    Sexual activity: Not on file   Other Topics Concern    Not on file   Social History Narrative    Not on file     Social Determinants of Health     Financial Resource Strain: Not on file   Food Insecurity: Not on file   Transportation Needs: Not on file   Physical Activity: Not on file   Stress: Not on file   Social Connections: Not on file   Intimate Partner Violence: Not on file   Housing Stability: Not on file       Family History   Problem Relation Age of Onset    Diabetes Mother     Sleep Apnea Father        Allergies:  Patient has no known allergies. Home Medications:  Prior to Admission medications    Medication Sig Start Date End Date Taking? Authorizing Provider   benzonatate (TESSALON) 100 MG capsule Take 1 capsule by mouth 2 times daily as needed for Cough 3/3/22   Breanne Gomes PA-C   HYDROcodone-acetaminophen (NORCO) 5-325 MG per tablet Take 1 tablet by mouth nightly as needed. 1/11/21   Historical Provider, MD   acetaminophen (TYLENOL) 325 MG tablet Take 650 mg by mouth every 6 hours as needed for Pain  1/3/21  Historical Provider, MD       REVIEW OF SYSTEMS    (2-9 systems for level 4, 10 or more for level 5)      Review of Systems   Constitutional:  Positive for fatigue. Negative for chills and fever. HENT:  Negative for congestion and trouble swallowing. Eyes:  Negative for visual disturbance. Respiratory:  Positive for cough. Negative for chest tightness and shortness of breath. Gastrointestinal:  Negative for abdominal pain, diarrhea, nausea and vomiting. Endocrine: Negative for polyuria. Genitourinary:  Negative for dysuria, flank pain, hematuria and urgency. Musculoskeletal:  Positive for arthralgias and myalgias. Negative for back pain. Skin:  Negative for color change. Allergic/Immunologic: Negative for immunocompromised state. Neurological:  Negative for dizziness, syncope, weakness, light-headedness and headaches. PHYSICAL EXAM   (up to 7 for level 4, 8 or more for level 5)      INITIAL VITALS:   BP (!) 145/77   Pulse 85   Temp 98.9 °F (37.2 °C)   Resp 16   Ht 6' (1.829 m)   Wt 220 lb (99.8 kg)   SpO2 97%   BMI 29.84 kg/m²     Physical Exam  Constitutional:       Appearance: He is well-developed. HENT:      Head: Normocephalic and atraumatic. Nose: Nose normal.      Mouth/Throat:      Mouth: Mucous membranes are moist.   Eyes:      Conjunctiva/sclera: Conjunctivae normal.   Cardiovascular:      Rate and Rhythm: Normal rate and regular rhythm. Heart sounds: Normal heart sounds. No murmur heard. No friction rub. Pulmonary:      Effort: Pulmonary effort is normal. No respiratory distress. Breath sounds: Normal breath sounds. No rales. Chest:      Chest wall: No tenderness. Abdominal:      General: Abdomen is flat. Palpations: Abdomen is soft. There is no hepatomegaly, splenomegaly or pulsatile mass. Tenderness: There is no abdominal tenderness. Hernia: No hernia is present. Skin:     General: Skin is warm. Capillary Refill: Capillary refill takes less than 2 seconds. Neurological:      General: No focal deficit present. Mental Status: He is alert. Motor: No weakness.        DIFFERENTIAL  DIAGNOSIS     PLAN (LABS / IMAGING / EKG):  Orders Placed This Encounter   Procedures    COVID-19 & Influenza Combo       MEDICATIONS ORDERED:  Orders Placed This Encounter   Medications    acetaminophen (TYLENOL) tablet 1,000 mg    ibuprofen (ADVIL;MOTRIN) tablet 800 mg         DIAGNOSTIC RESULTS / EMERGENCY DEPARTMENT COURSE / MDM   LAB RESULTS:  Results for orders placed or performed during the hospital encounter of 12/09/22   COVID-19 & Influenza Combo    Specimen: Nasopharyngeal Swab   Result Value Ref Range    Specimen Description . NASOPHARYNGEAL SWAB     Source . NASOPHARYNGEAL SWAB     SARS-CoV-2 RNA, RT PCR DETECTED (A) Not Detected    INFLUENZA A Not Detected Not Detected    INFLUENZA B Not Detected Not Detected           EMERGENCY DEPARTMENT COURSE:  ED Course as of 12/10/22 0044   Fri Dec 09, 2022   2158 COVID +, given Tylenol Motrin, sandwich, tolerating p.o., ER return precautions given follow-up instructions given [KK]      ED Course User Index  [KK] Osiris Corado DO         Assessment/Plan: 60-year-old male, COVID-positive, treated with Tylenol Motrin. Tolerating p.o. Appropriate for discharge and outpatient follow-up. ER return precautions given to the patient. Follow-up instructions given to the patient. FINAL IMPRESSION      1. Body aches    2. Viral syndrome    3.  COVID-19          DISPOSITION / PLAN     DISPOSITION Decision To Discharge 12/09/2022 09:22:18 PM      PATIENT REFERRED TO:  [facility]  [place] [address]  [phone]  Go to   As needed    DISCHARGE MEDICATIONS:  Discharge Medication List as of 12/9/2022  9:58 PM          Kait George DO  Emergency Medicine Resident    (Please note that portions of thisnote were completed with a voice recognition program.  Efforts were made to edit the dictations but occasionally words are mis-transcribed.)       Osiris Corado DO  Resident  12/10/22 [id number]

## 2022-12-10 NOTE — ED TRIAGE NOTES
Mode of arrival (squad #, walk in, police, etc) : walk in        Chief complaint(s): body aches        Arrival Note (brief scenario, treatment PTA, etc). : states this started 5 days ago, chills, headaches, body aches, shaking         C= \"Have you ever felt that you should Cut down on your drinking? \"  No  A= \"Have people Annoyed you by criticizing your drinking? \"  No  G= \"Have you ever felt bad or Guilty about your drinking? \"  No  E= \"Have you ever had a drink as an Eye-opener first thing in the morning to steady your nerves or to help a hangover? \"  No      Deferred []      Reason for deferring: N/A    *If yes to two or more: probable alcohol abuse. *

## (undated) DEVICE — SUTURE SURGLON 3-0 L18IN NONABSORBABLE WHT P-12 L19MM 3/8 SBS1883G

## (undated) DEVICE — ROLLER BDG 10YDX2IN GAUZE 1 PLY ABSORB

## (undated) DEVICE — SPLINT ORTH W4XL15IN WHT FBRGLS CNFRM STR LTWT SCOTCHCAST

## (undated) DEVICE — SVMMC HND

## (undated) DEVICE — GLOVE ORANGE PI 7   MSG9070

## (undated) DEVICE — GLOVE ORANGE PI 8   MSG9080

## (undated) DEVICE — INTENDED FOR TISSUE SEPARATION, AND OTHER PROCEDURES THAT REQUIRE A SHARP SURGICAL BLADE TO PUNCTURE OR CUT.: Brand: BARD-PARKER ® CARBON RIB-BACK BLADES

## (undated) DEVICE — GAUZE,SPONGE,4"X4",16PLY,XRAY,STRL,LF: Brand: MEDLINE

## (undated) DEVICE — COVER LT HNDL BLU PLAS

## (undated) DEVICE — GARMENT,MEDLINE,DVT,INT,CALF,MED, GEN2: Brand: MEDLINE

## (undated) DEVICE — Z DISCONTINUED BY MEDLINE USE 2711682 TRAY SKIN PREP DRY W/ PREM GLV

## (undated) DEVICE — GLOVE SURG SZ 65 THK91MIL LTX FREE SYN POLYISOPRENE

## (undated) DEVICE — SUTURE PROLENE C-1 MLTIPK BL  6-0 30 8889H

## (undated) DEVICE — TOWEL,OR,DSP,ST,NATURAL,DLX,4/PK,20PK/CS: Brand: MEDLINE

## (undated) DEVICE — SYRINGE MED 10ML LUERLOCK TIP W/O SFTY DISP